# Patient Record
Sex: MALE | Race: WHITE | HISPANIC OR LATINO | URBAN - METROPOLITAN AREA
[De-identification: names, ages, dates, MRNs, and addresses within clinical notes are randomized per-mention and may not be internally consistent; named-entity substitution may affect disease eponyms.]

---

## 2019-11-26 ENCOUNTER — TELEPHONE (OUTPATIENT)
Dept: URGENT CARE | Facility: CLINIC | Age: 39
End: 2019-11-26

## 2019-11-26 NOTE — TELEPHONE ENCOUNTER
Scheduled referral appointment with general surgery [address] for December 17th at 10:[address] Quigley. Office asked Pt to arrive at 10:15 to fill out new patient paperwork. Office also asked Pt to bring copy of photo id, insurance card, and list of current medications. Faxed Pt note to [phone]. Pt given referral appointment information stated he currently did not want the appointment he wanted to wait. Pt was given Office number to reschedule to the best of his needs no further questions.